# Patient Record
Sex: MALE | Employment: FULL TIME | URBAN - METROPOLITAN AREA
[De-identification: names, ages, dates, MRNs, and addresses within clinical notes are randomized per-mention and may not be internally consistent; named-entity substitution may affect disease eponyms.]

---

## 2018-05-01 ENCOUNTER — OFFICE VISIT (OUTPATIENT)
Dept: MEDICAL GROUP | Facility: MEDICAL CENTER | Age: 33
End: 2018-05-01
Payer: COMMERCIAL

## 2018-05-01 VITALS
TEMPERATURE: 98.5 F | BODY MASS INDEX: 28.29 KG/M2 | DIASTOLIC BLOOD PRESSURE: 68 MMHG | SYSTOLIC BLOOD PRESSURE: 116 MMHG | HEART RATE: 80 BPM | HEIGHT: 69 IN | OXYGEN SATURATION: 97 % | WEIGHT: 191 LBS

## 2018-05-01 DIAGNOSIS — F11.10 OPIOID ABUSE (HCC): ICD-10-CM

## 2018-05-01 DIAGNOSIS — D22.9 ATYPICAL NEVUS: ICD-10-CM

## 2018-05-01 DIAGNOSIS — F32.0 CURRENT MILD EPISODE OF MAJOR DEPRESSIVE DISORDER WITHOUT PRIOR EPISODE (HCC): ICD-10-CM

## 2018-05-01 DIAGNOSIS — Z76.89 ENCOUNTER TO ESTABLISH CARE: ICD-10-CM

## 2018-05-01 PROBLEM — F32.9 MAJOR DEPRESSIVE DISORDER WITH CURRENT ACTIVE EPISODE: Status: ACTIVE | Noted: 2018-05-01

## 2018-05-01 PROCEDURE — 99204 OFFICE O/P NEW MOD 45 MIN: CPT | Performed by: PHYSICIAN ASSISTANT

## 2018-05-01 ASSESSMENT — PATIENT HEALTH QUESTIONNAIRE - PHQ9
CLINICAL INTERPRETATION OF PHQ2 SCORE: 4
SUM OF ALL RESPONSES TO PHQ QUESTIONS 1-9: 12
5. POOR APPETITE OR OVEREATING: 0 - NOT AT ALL

## 2018-05-01 NOTE — ASSESSMENT & PLAN NOTE
Complains of a irregular lesion on his upper back. Been there for about 6 months. Would like to see dermatologist.

## 2018-05-01 NOTE — PROGRESS NOTES
"Subjective:   Jordon Mclean is a 33 y.o. male here today for opioid abuse, chronic history, and to establish care.    Opioid abuse  This is a 34-year-old male complains of a chronic history of opioid abuse. He's been abusing opioids for approximately 8 years. When he was younger he was on Ritalin for ADHD. He thinks that this all started the process of him abusing prescribed medications. Currently he will take approximately 60 mg of oxycodone daily. At one point he was taking 120 mg which he had no effects with. Currently it is disrupting his family. He is  with one child. They're trying to have more children. Also very costly habit. He buys the medication wherever he can find it. Denies that it affects his work. 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 She denies any family history of any drug abuse or psychoses. He does have some underlying depression which he believes is from withdrawing from the medication. Denies any anxiety. Would like to be referred to see a specialist. He has tried NA previously but doesn't like the structure of the program.     Atypical nevus  Complains of a irregular lesion on his upper back. Been there for about 6 months. Would like to see dermatologist.       Current medicines (including changes today)  No current outpatient prescriptions on file.     No current facility-administered medications for this visit.      He  has no past medical history on file.    Social History and Family History were reviewed and updated.    ROS   No chest pain, no shortness of breath, no abdominal pain and all other systems were reviewed and are negative.       Objective:     Blood pressure 116/68, pulse 80, temperature 36.9 °C (98.5 °F), height 1.753 m (5' 9\"), weight 86.6 kg (191 lb), SpO2 97 %. Body mass index is 28.21 kg/m².   Physical Exam:  Constitutional: Alert, no distress.  Skin: Warm, dry, good turgor, no rashes in visible areas. There is irregular bordered lesion with " dark central pigmentation on the upper left back.  Eye: Equal, round and reactive, conjunctiva clear, lids normal.  ENMT: Lips without lesions, good dentition, oropharynx clear.  Neck: Trachea midline, no masses.   Lymph: No cervical or supraclavicular lymphadenopathy  Respiratory: Unlabored respiratory effort, lungs appear clear, no wheezes.  Cardiovascular: Normal S1, S2, no murmur, no edema.  Psych: Alert and oriented x3, normal affect and mood.        Assessment and Plan:   The following treatment plan was discussed    1. Opioid abuse  Chronic condition. Referred to psychiatry and behavioral health. Discussed with possible medication options.  - Patient has been identified as being depressed and appropriate orders and counseling have been given  - REFERRAL TO PSYCHIATRY  - REFERRAL TO BEHAVIORAL HEALTH    2. Current mild episode of major depressive disorder without prior episode (HCC)  Chronic condition. Referred to psychiatry to establish care.  - Patient has been identified as being depressed and appropriate orders and counseling have been given  - REFERRAL TO PSYCHIATRY  - REFERRAL TO BEHAVIORAL HEALTH    3. Atypical nevus  Chronic condition. Refer to dermatology to establish care.  - REFERRAL TO DERMATOLOGY    4. Encounter to establish care      In follow-up we'll need to address labs to rule out early heart disease. Advised exercise routinely. Healthier.    Followup: Return if symptoms worsen or fail to improve.    Please note that this dictation was created using voice recognition software. I have made every reasonable attempt to correct obvious errors, but I expect that there are errors of grammar and possibly content that I did not discover before finalizing the note.

## 2018-05-01 NOTE — ASSESSMENT & PLAN NOTE
This is a 34-year-old male complains of a chronic history of opioid abuse. He's been abusing opioids for approximately 8 years. When he was younger he was on Ritalin for ADHD. He thinks that this all started the process of him abusing prescribed medications. Currently he will take approximately 60 mg of oxycodone daily. At one point he was taking 120 mg which he had no effects with. Currently it is disrupting his family. He is  with one child. They're trying to have more children. Also very costly habit. He buys the medication wherever he can find it. Denies that it affects his work. 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 She denies any family history of any drug abuse or psychoses. He does have some underlying depression which he believes is from withdrawing from the medication. Denies any anxiety. Would like to be referred to see a specialist. He has tried NA previously but doesn't like the structure of the program.

## 2018-08-17 ENCOUNTER — NON-PROVIDER VISIT (OUTPATIENT)
Dept: URGENT CARE | Facility: CLINIC | Age: 33
End: 2018-08-17

## 2018-08-17 DIAGNOSIS — Z02.1 PRE-EMPLOYMENT DRUG SCREENING: ICD-10-CM

## 2018-08-17 LAB
AMP AMPHETAMINE: NORMAL
COC COCAINE: NORMAL
INT CON NEG: NORMAL
INT CON POS: NORMAL
MET METHAMPHETAMINES: NORMAL
OPI OPIATES: NORMAL
PCP PHENCYCLIDINE: NORMAL
POC DRUG COMMENT 753798-OCCUPATIONAL HEALTH: NEGATIVE
THC: NORMAL

## 2018-08-17 PROCEDURE — 80305 DRUG TEST PRSMV DIR OPT OBS: CPT | Performed by: FAMILY MEDICINE

## 2018-09-06 ENCOUNTER — OFFICE VISIT (OUTPATIENT)
Dept: MEDICAL GROUP | Facility: MEDICAL CENTER | Age: 33
End: 2018-09-06
Payer: COMMERCIAL

## 2018-09-06 VITALS
SYSTOLIC BLOOD PRESSURE: 126 MMHG | BODY MASS INDEX: 28.93 KG/M2 | WEIGHT: 195.33 LBS | OXYGEN SATURATION: 97 % | HEART RATE: 88 BPM | TEMPERATURE: 97.5 F | HEIGHT: 69 IN | DIASTOLIC BLOOD PRESSURE: 80 MMHG

## 2018-09-06 DIAGNOSIS — M54.41 CHRONIC BILATERAL LOW BACK PAIN WITH RIGHT-SIDED SCIATICA: ICD-10-CM

## 2018-09-06 DIAGNOSIS — F51.01 PRIMARY INSOMNIA: ICD-10-CM

## 2018-09-06 DIAGNOSIS — F32.0 CURRENT MILD EPISODE OF MAJOR DEPRESSIVE DISORDER WITHOUT PRIOR EPISODE (HCC): ICD-10-CM

## 2018-09-06 DIAGNOSIS — F11.11 HISTORY OF OPIOID ABUSE (HCC): ICD-10-CM

## 2018-09-06 DIAGNOSIS — F39 MOOD DISORDER (HCC): ICD-10-CM

## 2018-09-06 DIAGNOSIS — G89.29 CHRONIC BILATERAL LOW BACK PAIN WITH RIGHT-SIDED SCIATICA: ICD-10-CM

## 2018-09-06 DIAGNOSIS — D22.9 ATYPICAL NEVUS: ICD-10-CM

## 2018-09-06 PROBLEM — M54.50 CHRONIC BILATERAL LOW BACK PAIN: Status: ACTIVE | Noted: 2018-09-06

## 2018-09-06 PROCEDURE — 99214 OFFICE O/P EST MOD 30 MIN: CPT | Performed by: PHYSICIAN ASSISTANT

## 2018-09-06 RX ORDER — TRAZODONE HYDROCHLORIDE 50 MG/1
50 TABLET ORAL
Qty: 90 TAB | Refills: 3 | Status: SHIPPED | OUTPATIENT
Start: 2018-09-06 | End: 2022-07-18

## 2018-09-06 RX ORDER — BACLOFEN 20 MG/1
20 TABLET ORAL 3 TIMES DAILY
COMMUNITY
End: 2018-09-06 | Stop reason: SDUPTHER

## 2018-09-06 RX ORDER — TRAZODONE HYDROCHLORIDE 50 MG/1
50 TABLET ORAL NIGHTLY
COMMUNITY
End: 2018-09-06 | Stop reason: SDUPTHER

## 2018-09-06 RX ORDER — BACLOFEN 20 MG/1
20 TABLET ORAL 3 TIMES DAILY
Qty: 30 TAB | Refills: 0 | Status: SHIPPED | OUTPATIENT
Start: 2018-09-06 | End: 2019-04-23 | Stop reason: SDUPTHER

## 2018-09-06 RX ORDER — NALTREXONE HYDROCHLORIDE 50 MG/1
50 TABLET, FILM COATED ORAL DAILY
COMMUNITY
End: 2018-09-06 | Stop reason: SDUPTHER

## 2018-09-06 RX ORDER — OXCARBAZEPINE 300 MG/1
300 TABLET, FILM COATED ORAL 2 TIMES DAILY
Qty: 60 TAB | Refills: 0 | Status: SHIPPED | OUTPATIENT
Start: 2018-09-06 | End: 2018-10-15 | Stop reason: SDUPTHER

## 2018-09-06 RX ORDER — ESCITALOPRAM OXALATE 20 MG/1
20 TABLET ORAL DAILY
Qty: 90 TAB | Refills: 3 | Status: SHIPPED | OUTPATIENT
Start: 2018-09-06 | End: 2019-09-03 | Stop reason: SDUPTHER

## 2018-09-06 RX ORDER — ESCITALOPRAM OXALATE 20 MG/1
20 TABLET ORAL DAILY
COMMUNITY
End: 2018-09-06 | Stop reason: SDUPTHER

## 2018-09-06 RX ORDER — OXCARBAZEPINE 300 MG/1
300 TABLET, FILM COATED ORAL 2 TIMES DAILY
COMMUNITY
End: 2018-09-06 | Stop reason: SDUPTHER

## 2018-09-06 RX ORDER — NALTREXONE HYDROCHLORIDE 50 MG/1
50 TABLET, FILM COATED ORAL DAILY
Qty: 30 TAB | Refills: 0 | Status: SHIPPED | OUTPATIENT
Start: 2018-09-06 | End: 2019-04-23

## 2018-09-06 NOTE — ASSESSMENT & PLAN NOTE
His also currently taking Trileptal 300 mg twice a day. States medication is used as a mood stabilizer. Denies any homicidal or suicidal ideations. Doesn't yet have an appointment to see psychiatry.

## 2018-09-06 NOTE — ASSESSMENT & PLAN NOTE
Has insomnia. Takes trazodone 50 mg at bedtime. Medication does cause drowsiness. Takes only when needed. Was on a higher dose through treatment.

## 2018-09-06 NOTE — ASSESSMENT & PLAN NOTE
This is a 33-year-old male who came in to the office late today. He has chronic use of opioids and other polysubstances. He was down at [facility] for a few weeks detoxing. The initial trigger for the detox was a DUI. He is currently on Depade 50 mg daily. Has not used opioids in several months.

## 2018-09-06 NOTE — PROGRESS NOTES
Subjective:   Jordon Mclean is a 33 y.o. male here today for history of opioid abuse, mood disorder, depression, chronic low back pain and primary insomnia.    History of opioid abuse  This is a 33-year-old male who came in to the office late today. He has chronic use of opioids and other polysubstances. He was down at [facility] for a few weeks detoxing. The initial trigger for the detox was a DUI. He is currently on Depade 50 mg daily. Has not used opioids in several months.    Mood disorder (HCC)  His also currently taking Trileptal 300 mg twice a day. States medication is used as a mood stabilizer. Denies any homicidal or suicidal ideations. Doesn't yet have an appointment to see psychiatry.    Major depressive disorder with current active episode  In the past I diagnosed him with depression. Although he doesn't feel depressed he is taking Lexapro. 20 mg daily.    Chronic bilateral low back pain with right-sided sciatica  During his treatment he was dealing with chronic low back pain. Right-sided sciatica. He was given baclofen 20 mg which she will take as needed for back pain as well as sleep. Like to follow up with a specialist not to discuss medication but to discuss treatment options.    Atypical nevus  Never followed up with dermatology for the mole on his back. Would like to have another referral.    Primary insomnia  Has insomnia. Takes trazodone 50 mg at bedtime. Medication does cause drowsiness. Takes only when needed. Was on a higher dose through treatment.       Current medicines (including changes today)  Current Outpatient Prescriptions   Medication Sig Dispense Refill   • escitalopram (LEXAPRO) 20 MG tablet Take 1 Tab by mouth every day. 90 Tab 3   • OXcarbazepine (TRILEPTAL) 300 MG Tab Take 1 Tab by mouth 2 times a day. 60 Tab 0   • traZODone (DESYREL) 50 MG Tab Take 1 Tab by mouth every bedtime. 90 Tab 3   • naltrexone (DEPADE) 50 MG Tab Take 1 Tab by mouth every day. 30 Tab 0   •  "baclofen (LIORESAL) 20 MG tablet Take 1 Tab by mouth 3 times a day. 30 Tab 0     No current facility-administered medications for this visit.      He  has no past medical history on file.    Social History and Family History were reviewed and updated.    ROS   No chest pain, no shortness of breath, no abdominal pain and all other systems were reviewed and are negative.       Objective:     Blood pressure 126/80, pulse 88, temperature 36.4 °C (97.5 °F), height 1.753 m (5' 9\"), weight 88.6 kg (195 lb 5.2 oz), SpO2 97 %. Body mass index is 28.84 kg/m².   Physical Exam:  Constitutional: Alert, no distress.  Skin: Warm, dry, good turgor, no rashes in visible areas.  Eye: Equal, round and reactive, conjunctiva clear, lids normal.  ENMT: Lips without lesions, good dentition, oropharynx clear.  Neck: Trachea midline, no masses.   Lymph: No cervical or supraclavicular lymphadenopathy  Respiratory: Unlabored respiratory effort, lungs appear clear, no wheezes.  Cardiovascular: Normal S1, S2, no murmur, no edema.  Psych: Alert and oriented x3, normal affect and mood.        Assessment and Plan:   The following treatment plan was discussed    1. History of opioid abuse  In remission. Renewed naltrexone one months supply. Refer to psychiatry.  - naltrexone (DEPADE) 50 MG Tab; Take 1 Tab by mouth every day.  Dispense: 30 Tab; Refill: 0  - REFERRAL TO PSYCHIATRY    2. Current mild episode of major depressive disorder without prior episode (HCC)  Chronic condition. Stable. Prescribed Lexapro 20 mg. 4 to psychiatry.  - escitalopram (LEXAPRO) 20 MG tablet; Take 1 Tab by mouth every day.  Dispense: 90 Tab; Refill: 3  - REFERRAL TO PSYCHIATRY    3. Mood disorder (HCC)  Chronic condition. Stable. Prescribed Trileptal 300 mg twice a day. Referred to psychiatry.  - OXcarbazepine (TRILEPTAL) 300 MG Tab; Take 1 Tab by mouth 2 times a day.  Dispense: 60 Tab; Refill: 0  - REFERRAL TO PSYCHIATRY    4. Primary insomnia  Chronic condition. " Stable. Renewed trazodone. Take as needed.  - traZODone (DESYREL) 50 MG Tab; Take 1 Tab by mouth every bedtime.  Dispense: 90 Tab; Refill: 3  - REFERRAL TO PSYCHIATRY    5. Chronic bilateral low back pain with right-sided sciatica  Chronic condition. Referred to physiatry to establish care. Provided prescription for baclofen as needed.  - baclofen (LIORESAL) 20 MG tablet; Take 1 Tab by mouth 3 times a day.  Dispense: 30 Tab; Refill: 0  - REFERRAL TO PHYSIATRY (PMR)    6. Atypical nevus  Referred again to dermatology.  - REFERRAL TO DERMATOLOGY      Followup: Return in about 4 weeks (around 10/4/2018), or if symptoms worsen or fail to improve.    Please note that this dictation was created using voice recognition software. I have made every reasonable attempt to correct obvious errors, but I expect that there are errors of grammar and possibly content that I did not discover before finalizing the note.

## 2018-09-06 NOTE — ASSESSMENT & PLAN NOTE
During his treatment he was dealing with chronic low back pain. Right-sided sciatica. He was given baclofen 20 mg which she will take as needed for back pain as well as sleep. Like to follow up with a specialist not to discuss medication but to discuss treatment options.

## 2018-10-15 DIAGNOSIS — F39 MOOD DISORDER (HCC): ICD-10-CM

## 2018-10-16 RX ORDER — OXCARBAZEPINE 300 MG/1
TABLET, FILM COATED ORAL
Qty: 60 TAB | Refills: 3 | Status: SHIPPED | OUTPATIENT
Start: 2018-10-16 | End: 2019-12-20

## 2018-12-18 ENCOUNTER — APPOINTMENT (RX ONLY)
Dept: URBAN - METROPOLITAN AREA CLINIC 22 | Facility: CLINIC | Age: 33
Setting detail: DERMATOLOGY
End: 2018-12-18

## 2018-12-18 DIAGNOSIS — Z71.89 OTHER SPECIFIED COUNSELING: ICD-10-CM

## 2018-12-18 DIAGNOSIS — L81.4 OTHER MELANIN HYPERPIGMENTATION: ICD-10-CM

## 2018-12-18 DIAGNOSIS — L82.1 OTHER SEBORRHEIC KERATOSIS: ICD-10-CM

## 2018-12-18 DIAGNOSIS — D22 MELANOCYTIC NEVI: ICD-10-CM

## 2018-12-18 PROBLEM — D48.5 NEOPLASM OF UNCERTAIN BEHAVIOR OF SKIN: Status: ACTIVE | Noted: 2018-12-18

## 2018-12-18 PROBLEM — D22.61 MELANOCYTIC NEVI OF RIGHT UPPER LIMB, INCLUDING SHOULDER: Status: ACTIVE | Noted: 2018-12-18

## 2018-12-18 PROBLEM — D22.5 MELANOCYTIC NEVI OF TRUNK: Status: ACTIVE | Noted: 2018-12-18

## 2018-12-18 PROBLEM — D22.62 MELANOCYTIC NEVI OF LEFT UPPER LIMB, INCLUDING SHOULDER: Status: ACTIVE | Noted: 2018-12-18

## 2018-12-18 PROCEDURE — ? BIOPSY BY SHAVE METHOD

## 2018-12-18 PROCEDURE — ? COUNSELING

## 2018-12-18 PROCEDURE — 11100: CPT

## 2018-12-18 PROCEDURE — 99202 OFFICE O/P NEW SF 15 MIN: CPT | Mod: 25

## 2018-12-18 PROCEDURE — ? REFERRAL CORRESPONDENCE

## 2018-12-18 ASSESSMENT — LOCATION SIMPLE DESCRIPTION DERM
LOCATION SIMPLE: LEFT FOREARM
LOCATION SIMPLE: RIGHT FOREARM
LOCATION SIMPLE: ABDOMEN
LOCATION SIMPLE: RIGHT LOWER BACK
LOCATION SIMPLE: RIGHT UPPER BACK

## 2018-12-18 ASSESSMENT — LOCATION DETAILED DESCRIPTION DERM
LOCATION DETAILED: LEFT PROXIMAL DORSAL FOREARM
LOCATION DETAILED: RIGHT SUPERIOR UPPER BACK
LOCATION DETAILED: LEFT DISTAL DORSAL FOREARM
LOCATION DETAILED: RIGHT MEDIAL UPPER BACK
LOCATION DETAILED: EPIGASTRIC SKIN
LOCATION DETAILED: RIGHT SUPERIOR LATERAL UPPER BACK
LOCATION DETAILED: RIGHT PROXIMAL DORSAL FOREARM
LOCATION DETAILED: RIGHT SUPERIOR MEDIAL MIDBACK

## 2018-12-18 ASSESSMENT — LOCATION ZONE DERM
LOCATION ZONE: TRUNK
LOCATION ZONE: ARM

## 2018-12-18 NOTE — PROCEDURE: BIOPSY BY SHAVE METHOD
Detail Level: Detailed
Biopsy Method: Personna blade
Curettage Text: The wound bed was treated with curettage after the biopsy was performed.
Lab: [id number]
Silver Nitrate Text: The wound bed was treated with silver nitrate after the biopsy was performed.
Notification Instructions: Patient will be notified of biopsy results. However, patient instructed to call the office if not contacted within 2 weeks.
Bill For Surgical Tray: no
Additional Anesthesia Volume In Cc (Will Not Render If 0): 0
Cryotherapy Text: The wound bed was treated with cryotherapy after the biopsy was performed.
Anesthesia Type: 1% lidocaine with 1:100,000 epinephrine
Lab Facility: 
Render Post-Care Instructions In Note?: yes
Wound Care: Vaseline
Consent: Written consent was obtained and risks were reviewed including but not limited to scarring, infection, bleeding, scabbing, incomplete removal, nerve damage and allergy to anesthesia.
Billing Type: Third-Party Bill
Depth Of Biopsy: dermis
Anesthesia Volume In Cc: 1
Biopsy Type: H and E
Electrodesiccation Text: The wound bed was treated with electrodesiccation after the biopsy was performed.
Post-Care Instructions: I reviewed with the patient in detail post-care instructions. Patient is to keep the biopsy site dry overnight, and then apply bacitracin twice daily until healed. Patient may apply hydrogen peroxide soaks to remove any crusting.
Hemostasis: Drysol
Type Of Destruction Used: Curettage
Electrodesiccation And Curettage Text: The wound bed was treated with electrodesiccation and curettage after the biopsy was performed.
Dressing: bandage

## 2019-04-23 ENCOUNTER — OFFICE VISIT (OUTPATIENT)
Dept: MEDICAL GROUP | Facility: MEDICAL CENTER | Age: 34
End: 2019-04-23
Payer: COMMERCIAL

## 2019-04-23 VITALS
BODY MASS INDEX: 32.98 KG/M2 | RESPIRATION RATE: 18 BRPM | HEART RATE: 78 BPM | TEMPERATURE: 98.6 F | OXYGEN SATURATION: 94 % | HEIGHT: 69 IN | SYSTOLIC BLOOD PRESSURE: 126 MMHG | WEIGHT: 222.66 LBS | DIASTOLIC BLOOD PRESSURE: 74 MMHG

## 2019-04-23 DIAGNOSIS — G89.29 CHRONIC BILATERAL LOW BACK PAIN WITH RIGHT-SIDED SCIATICA: ICD-10-CM

## 2019-04-23 DIAGNOSIS — M54.41 CHRONIC BILATERAL LOW BACK PAIN WITH RIGHT-SIDED SCIATICA: ICD-10-CM

## 2019-04-23 DIAGNOSIS — E66.9 OBESITY (BMI 30-39.9): ICD-10-CM

## 2019-04-23 PROCEDURE — 99214 OFFICE O/P EST MOD 30 MIN: CPT | Performed by: PHYSICIAN ASSISTANT

## 2019-04-23 RX ORDER — BACLOFEN 20 MG/1
20 TABLET ORAL 3 TIMES DAILY
Qty: 30 TAB | Refills: 0 | Status: SHIPPED | OUTPATIENT
Start: 2019-04-23 | End: 2019-04-23 | Stop reason: SDUPTHER

## 2019-04-23 RX ORDER — BACLOFEN 20 MG/1
20 TABLET ORAL 3 TIMES DAILY
Qty: 60 TAB | Refills: 1 | Status: SHIPPED | OUTPATIENT
Start: 2019-04-23 | End: 2019-12-20

## 2019-04-23 ASSESSMENT — PATIENT HEALTH QUESTIONNAIRE - PHQ9
5. POOR APPETITE OR OVEREATING: MORE THAN HALF THE DAYS
SUM OF ALL RESPONSES TO PHQ QUESTIONS 1-9: 14
8. MOVING OR SPEAKING SO SLOWLY THAT OTHER PEOPLE COULD HAVE NOTICED. OR THE OPPOSITE, BEING SO FIGETY OR RESTLESS THAT YOU HAVE BEEN MOVING AROUND A LOT MORE THAN USUAL: NOT AT ALL
6. FEELING BAD ABOUT YOURSELF - OR THAT YOU ARE A FAILURE OR HAVE LET YOURSELF OR YOUR FAMILY DOWN: SEVERAL DAYS
9. THOUGHTS THAT YOU WOULD BE BETTER OFF DEAD, OR OF HURTING YOURSELF: NOT AT ALL
SUM OF ALL RESPONSES TO PHQ9 QUESTIONS 1 AND 2: 2
2. FEELING DOWN, DEPRESSED, IRRITABLE, OR HOPELESS: SEVERAL DAYS
3. TROUBLE FALLING OR STAYING ASLEEP OR SLEEPING TOO MUCH: NEARLY EVERY DAY
4. FEELING TIRED OR HAVING LITTLE ENERGY: NEARLY EVERY DAY
1. LITTLE INTEREST OR PLEASURE IN DOING THINGS: SEVERAL DAYS
7. TROUBLE CONCENTRATING ON THINGS, SUCH AS READING THE NEWSPAPER OR WATCHING TELEVISION: NEARLY EVERY DAY

## 2019-04-23 NOTE — PROGRESS NOTES
Subjective:   Jordon Mclean is a 34 y.o. male here today for chronic low back pain with right-sided sciatica with continuing of symptoms.    Chronic bilateral low back pain with right-sided sciatica  This is a 34-year-old male who returns today to discuss his chronic history of low back pain and right-sided sciatica.  I saw him in September and referred him to physiatry.  He thought conservative therapy with chiropractic services as well as physical therapy would be more helpful so he never followed up with physiatry.  States he has pain now consistently.  Has pain in the foot that radiates up to the leg and vice versa.  States he has pain the minute he wakes up.  Pain lasts all day long and is worse when he stands all day.  Also has pain at nighttime.  He believes he may have plantar fasciitis.  He has a history of opiate abuse.  Last time we talked about referral but no medication.  He still does not want any medication.  He was taking Flexeril in the past which I provided him at bedtime.  Would like a renewal of that.  States that he is in a better place with his life.  His wife is pregnant with their second child.  States that his depression is stable.  Still taking his medication for depression as directed.  No refill needed.       Current medicines (including changes today)  Current Outpatient Prescriptions   Medication Sig Dispense Refill   • baclofen (LIORESAL) 20 MG tablet Take 1 Tab by mouth 3 times a day. 60 Tab 1   • OXcarbazepine (TRILEPTAL) 300 MG Tab TAKE 1 TABLET BY MOUTH TWICE DAILY 60 Tab 3   • escitalopram (LEXAPRO) 20 MG tablet Take 1 Tab by mouth every day. 90 Tab 3   • traZODone (DESYREL) 50 MG Tab Take 1 Tab by mouth every bedtime. 90 Tab 3     No current facility-administered medications for this visit.      He  has no past medical history on file.    Social History and Family History were reviewed and updated.    ROS   No chest pain, no shortness of breath, no abdominal pain and all  "other systems were reviewed and are negative.       Objective:     /74 (BP Location: Left arm, Patient Position: Sitting, BP Cuff Size: Adult)   Pulse 78   Temp 37 °C (98.6 °F) (Temporal)   Resp 18   Ht 1.753 m (5' 9\")   Wt 101 kg (222 lb 10.6 oz)   SpO2 94%  Body mass index is 32.88 kg/m².   Physical Exam:  Constitutional: Alert, no distress.  Skin: Warm, dry, good turgor, no rashes in visible areas.  Eye: Equal, round and reactive, conjunctiva clear, lids normal.  ENMT: Lips without lesions, good dentition, oropharynx clear.  Neck: Trachea midline, no masses.   Lymph: No cervical or supraclavicular lymphadenopathy  Respiratory: Unlabored respiratory effort, lungs appear clear, no wheezes.  Cardiovascular: Normal S1, S2, no murmur, no edema.  Muscular skeletal: Right-sided lower paraspinal tenderness.  DTRs 2+.  Psych: Alert and oriented x3, normal affect and mood.        Assessment and Plan:   The following treatment plan was discussed    1. Chronic bilateral low back pain with right-sided sciatica  Chronic condition.  Discussed likely not with plantar fasciitis.  Foot pain would not radiate up to the leg and into the back.  Ordered MRI to rule out some impingement or stenosis.  Referred to [facility] [place] for evaluation also renewed baclofen as directed.  - REFERRAL TO PAIN MANAGEMENT  - MR-LUMBAR SPINE-W/O; Future  - baclofen (LIORESAL) 20 MG tablet; Take 1 Tab by mouth 3 times a day.  Dispense: 60 Tab; Refill: 1    2. Obesity (BMI 30-39.9)  New condition noted.  We will continue to monitor.  He is aware that now his body mass is considered obese.  - Patient identified as having weight management issue.  Appropriate orders and counseling given.    Patient was seen for 25 minutes face to face of which > 50% of appointment time was spent on counseling and coordination of care regarding the above.      Followup: Return in about 4 weeks (around 5/21/2019), or if symptoms worsen or fail to improve.    Please " note that this dictation was created using voice recognition software. I have made every reasonable attempt to correct obvious errors, but I expect that there are errors of grammar and possibly content that I did not discover before finalizing the note.

## 2019-04-23 NOTE — ASSESSMENT & PLAN NOTE
This is a 34-year-old male who returns today to discuss his chronic history of low back pain and right-sided sciatica.  I saw him in September and referred him to physiatry.  He thought conservative therapy with chiropractic services as well as physical therapy would be more helpful so he never followed up with physiatry.  States he has pain now consistently.  Has pain in the foot that radiates up to the leg and vice versa.  States he has pain the minute he wakes up.  Pain lasts all day long and is worse when he stands all day.  Also has pain at nighttime.  He believes he may have plantar fasciitis.  He has a history of opiate abuse.  Last time we talked about referral but no medication.  He still does not want any medication.  He was taking Flexeril in the past which I provided him at bedtime.  Would like a renewal of that.  States that he is in a better place with his life.  His wife is pregnant with their second child.  States that his depression is stable.  Still taking his medication for depression as directed.  No refill needed.

## 2019-10-20 DIAGNOSIS — G89.29 CHRONIC BILATERAL LOW BACK PAIN WITH RIGHT-SIDED SCIATICA: ICD-10-CM

## 2019-10-20 DIAGNOSIS — M54.41 CHRONIC BILATERAL LOW BACK PAIN WITH RIGHT-SIDED SCIATICA: ICD-10-CM

## 2019-12-20 ENCOUNTER — OFFICE VISIT (OUTPATIENT)
Dept: MEDICAL GROUP | Facility: MEDICAL CENTER | Age: 34
End: 2019-12-20
Payer: COMMERCIAL

## 2019-12-20 VITALS
TEMPERATURE: 98.2 F | SYSTOLIC BLOOD PRESSURE: 124 MMHG | RESPIRATION RATE: 16 BRPM | HEIGHT: 69 IN | WEIGHT: 220 LBS | HEART RATE: 90 BPM | DIASTOLIC BLOOD PRESSURE: 62 MMHG | OXYGEN SATURATION: 94 % | BODY MASS INDEX: 32.58 KG/M2

## 2019-12-20 DIAGNOSIS — F41.1 GAD (GENERALIZED ANXIETY DISORDER): ICD-10-CM

## 2019-12-20 DIAGNOSIS — F32.0 CURRENT MILD EPISODE OF MAJOR DEPRESSIVE DISORDER WITHOUT PRIOR EPISODE (HCC): ICD-10-CM

## 2019-12-20 DIAGNOSIS — Z23 NEED FOR VACCINATION: ICD-10-CM

## 2019-12-20 PROCEDURE — 90471 IMMUNIZATION ADMIN: CPT | Performed by: PHYSICIAN ASSISTANT

## 2019-12-20 PROCEDURE — 90686 IIV4 VACC NO PRSV 0.5 ML IM: CPT | Performed by: PHYSICIAN ASSISTANT

## 2019-12-20 PROCEDURE — 99214 OFFICE O/P EST MOD 30 MIN: CPT | Mod: 25 | Performed by: PHYSICIAN ASSISTANT

## 2019-12-20 RX ORDER — ESCITALOPRAM OXALATE 10 MG/1
10 TABLET ORAL DAILY
Qty: 30 TAB | Refills: 0 | Status: SHIPPED | OUTPATIENT
Start: 2019-12-20 | End: 2020-01-17

## 2019-12-20 RX ORDER — VENLAFAXINE HYDROCHLORIDE 37.5 MG/1
37.5 CAPSULE, EXTENDED RELEASE ORAL DAILY
Qty: 30 CAP | Refills: 3 | Status: SHIPPED | OUTPATIENT
Start: 2019-12-20 | End: 2020-01-17

## 2019-12-20 NOTE — ASSESSMENT & PLAN NOTE
This is a 34-year-old male who is here today to discuss a chronic history of depression and anxiety.  Depression has been stable but anxiety has been an underlying concern since starting Lexapro.  He is currently on 20 mg.  States that his reaction to issues of concerns do not aggravate him as much as the used to.  He still though has some issues with anxiety and getting overwhelmed.  Is wondering if his medication should be changed.  Spoke to a clinical psychologist that advised to maybe update the medication.  He is not on Trileptal any longer.  Weaned off the medication.  Also not taking baclofen for his chronic low back pain.  Overall though is doing well with a .  Family life is going well.  He is very active and trying to eat healthier.  Denies any homicidal or suicidal ideations.

## 2019-12-20 NOTE — PROGRESS NOTES
"Subjective:   Jordon Mclean is a 34 y.o. male here today for depression and anxiety.  Also need for influenza vaccination.    Major depressive disorder with current active episode  This is a 34-year-old male who is here today to discuss a chronic history of depression and anxiety.  Depression has been stable but anxiety has been an underlying concern since starting Lexapro.  He is currently on 20 mg.  States that his reaction to issues of concerns do not aggravate him as much as the used to.  He still though has some issues with anxiety and getting overwhelmed.  Is wondering if his medication should be changed.  Spoke to a clinical psychologist that advised to maybe update the medication.  He is not on Trileptal any longer.  Weaned off the medication.  Also not taking baclofen for his chronic low back pain.  Overall though is doing well with a .  Family life is going well.  He is very active and trying to eat healthier.  Denies any homicidal or suicidal ideations.       Current medicines (including changes today)  Current Outpatient Medications   Medication Sig Dispense Refill   • venlafaxine XR (EFFEXOR XR) 37.5 MG CAPSULE SR 24 HR Take 1 Cap by mouth every day. 30 Cap 3   • escitalopram (LEXAPRO) 10 MG Tab Take 1 Tab by mouth every day. 30 Tab 0   • escitalopram (LEXAPRO) 20 MG tablet Take 1 Tab by mouth every day. 30 Tab 0   • traZODone (DESYREL) 50 MG Tab Take 1 Tab by mouth every bedtime. 90 Tab 3     No current facility-administered medications for this visit.      He  has no past medical history on file.    Social History and Family History were reviewed and updated.    ROS   No chest pain, no shortness of breath, no abdominal pain and all other systems were reviewed and are negative.       Objective:     /62 (BP Location: Right arm, Patient Position: Sitting, BP Cuff Size: Adult)   Pulse 90   Temp 36.8 °C (98.2 °F) (Temporal)   Resp 16   Ht 1.753 m (5' 9\")   Wt 99.8 kg (220 lb)   " SpO2 94%  Body mass index is 32.49 kg/m².   Physical Exam:  Constitutional: Alert, no distress.  Skin: Warm, dry, good turgor, no rashes in visible areas.  Eye: Equal, round and reactive, conjunctiva clear, lids normal.  ENMT: Lips without lesions, good dentition, oropharynx clear.  Neck: Trachea midline, no masses.   Lymph: No cervical or supraclavicular lymphadenopathy  Respiratory: Unlabored respiratory effort, lungs appear clear, no wheezes.  Cardiovascular: Regular rate and rhythm.  Psych: Alert and oriented x3, normal affect and mood.      Assessment and Plan:   The following treatment plan was discussed    1. Current mild episode of major depressive disorder without prior episode (HCC)  Chronic condition.  Stable.  Given the anxiety will wean off the Lexapro and will start Effexor.  Discussed cross tapering.  - venlafaxine XR (EFFEXOR XR) 37.5 MG CAPSULE SR 24 HR; Take 1 Cap by mouth every day.  Dispense: 30 Cap; Refill: 3  - escitalopram (LEXAPRO) 10 MG Tab; Take 1 Tab by mouth every day.  Dispense: 30 Tab; Refill: 0    2. MERCEDEZ (generalized anxiety disorder)  Chronic condition.  Given the concern with anxiety today we will cross taper to Effexor.  Discussed likely same side effect profile.  Stop Lexapro after 5 to 10 days.  We will go to 10 mg from 20.  - venlafaxine XR (EFFEXOR XR) 37.5 MG CAPSULE SR 24 HR; Take 1 Cap by mouth every day.  Dispense: 30 Cap; Refill: 3  - escitalopram (LEXAPRO) 10 MG Tab; Take 1 Tab by mouth every day.  Dispense: 30 Tab; Refill: 0    3. Need for vaccination  Administered without complaints.  - Influenza Vaccine Quad Injection (PF)      Followup: Return in about 4 weeks (around 1/17/2020), or if symptoms worsen or fail to improve.    Please note that this dictation was created using voice recognition software. I have made every reasonable attempt to correct obvious errors, but I expect that there are errors of grammar and possibly content that I did not discover before  finalizing the note.

## 2020-01-17 ENCOUNTER — OFFICE VISIT (OUTPATIENT)
Dept: MEDICAL GROUP | Facility: MEDICAL CENTER | Age: 35
End: 2020-01-17
Payer: COMMERCIAL

## 2020-01-17 VITALS
SYSTOLIC BLOOD PRESSURE: 120 MMHG | DIASTOLIC BLOOD PRESSURE: 66 MMHG | WEIGHT: 218 LBS | HEIGHT: 69 IN | RESPIRATION RATE: 16 BRPM | TEMPERATURE: 98.2 F | OXYGEN SATURATION: 94 % | HEART RATE: 82 BPM | BODY MASS INDEX: 32.29 KG/M2

## 2020-01-17 DIAGNOSIS — F51.01 PRIMARY INSOMNIA: ICD-10-CM

## 2020-01-17 DIAGNOSIS — F32.0 CURRENT MILD EPISODE OF MAJOR DEPRESSIVE DISORDER WITHOUT PRIOR EPISODE (HCC): ICD-10-CM

## 2020-01-17 DIAGNOSIS — F41.1 GAD (GENERALIZED ANXIETY DISORDER): ICD-10-CM

## 2020-01-17 PROCEDURE — 99214 OFFICE O/P EST MOD 30 MIN: CPT | Performed by: PHYSICIAN ASSISTANT

## 2020-01-17 RX ORDER — VENLAFAXINE HYDROCHLORIDE 75 MG/1
75 CAPSULE, EXTENDED RELEASE ORAL DAILY
Qty: 30 CAP | Refills: 3 | Status: SHIPPED | OUTPATIENT
Start: 2020-01-17 | End: 2020-04-17 | Stop reason: SDUPTHER

## 2020-01-17 NOTE — PROGRESS NOTES
"Subjective:   Jordon Mclean is a 34 y.o. male here today for depression, anxiety and insomnia.    Major depressive disorder with current active episode  This is a 34-year-old male here today to discuss a chronic history of anxiety and depression.  During last visit anxiety was worse so I crossed weaned him off of Lexapro onto Effexor.  He did eventually wean off of Lexapro.  Has been taking 37.5 mg of Effexor over the past few weeks.  Has had some improvement but he does have outlying stresses such as job and and with his personal life and a baby.  Would like to increase on venlafaxine.    Primary insomnia  Chronic condition.  Insomnia has improved a lot.  Initially I gave him trazodone.  That caused him to feel groggy but was effective but currently he is doing well without any medication.       Current medicines (including changes today)  Current Outpatient Medications   Medication Sig Dispense Refill   • venlafaxine XR (EFFEXOR XR) 75 MG CAPSULE SR 24 HR Take 1 Cap by mouth every day. 30 Cap 3   • traZODone (DESYREL) 50 MG Tab Take 1 Tab by mouth every bedtime. 90 Tab 3     No current facility-administered medications for this visit.      He  has no past medical history on file.    Social History and Family History were reviewed and updated.    ROS   No chest pain, no shortness of breath, no abdominal pain and all other systems were reviewed and are negative.       Objective:     /66 (BP Location: Right arm, Patient Position: Sitting, BP Cuff Size: Adult)   Pulse 82   Temp 36.8 °C (98.2 °F) (Temporal)   Resp 16   Ht 1.753 m (5' 9\")   Wt 98.9 kg (218 lb)   SpO2 94%  Body mass index is 32.19 kg/m².   Physical Exam:  Constitutional: Alert, no distress.  Skin: Warm, dry, good turgor, no rashes in visible areas.  Eye: Equal, round and reactive, conjunctiva clear, lids normal.  ENMT: Lips without lesions, good dentition, oropharynx clear.  Neck: Trachea midline, no masses.   Lymph: No cervical or " supraclavicular lymphadenopathy  Respiratory: Unlabored respiratory effort, lungs appear clear, no wheezes.  Cardiovascular: Regular rate and rhythm.  Psych: Alert and oriented x3, normal affect and mood.        Assessment and Plan:   The following treatment plan was discussed    1. Current mild episode of major depressive disorder without prior episode (HCC)  Chronic condition.  Stable.  Increase Effexor to 75 mg.  - venlafaxine XR (EFFEXOR XR) 75 MG CAPSULE SR 24 HR; Take 1 Cap by mouth every day.  Dispense: 30 Cap; Refill: 3    2. MERCEDEZ (generalized anxiety disorder)  Chronic condition.  Stable.  Increase Effexor to 75 mg.  - venlafaxine XR (EFFEXOR XR) 75 MG CAPSULE SR 24 HR; Take 1 Cap by mouth every day.  Dispense: 30 Cap; Refill: 3    3. Primary insomnia  Chronic condition.  Stable.  Continue trazodone as needed.      Followup: Return in about 3 months (around 4/17/2020), or if symptoms worsen or fail to improve.    Please note that this dictation was created using voice recognition software. I have made every reasonable attempt to correct obvious errors, but I expect that there are errors of grammar and possibly content that I did not discover before finalizing the note.

## 2020-01-17 NOTE — ASSESSMENT & PLAN NOTE
Chronic condition.  Insomnia has improved a lot.  Initially I gave him trazodone.  That caused him to feel groggy but was effective but currently he is doing well without any medication.

## 2020-01-17 NOTE — ASSESSMENT & PLAN NOTE
This is a 34-year-old male here today to discuss a chronic history of anxiety and depression.  During last visit anxiety was worse so I crossed weaned him off of Lexapro onto Effexor.  He did eventually wean off of Lexapro.  Has been taking 37.5 mg of Effexor over the past few weeks.  Has had some improvement but he does have outlying stresses such as job and and with his personal life and a baby.  Would like to increase on venlafaxine.

## 2020-04-17 ENCOUNTER — OFFICE VISIT (OUTPATIENT)
Dept: MEDICAL GROUP | Facility: MEDICAL CENTER | Age: 35
End: 2020-04-17
Payer: COMMERCIAL

## 2020-04-17 VITALS
HEART RATE: 80 BPM | BODY MASS INDEX: 33.33 KG/M2 | TEMPERATURE: 98.5 F | WEIGHT: 225 LBS | SYSTOLIC BLOOD PRESSURE: 122 MMHG | DIASTOLIC BLOOD PRESSURE: 72 MMHG | OXYGEN SATURATION: 94 % | RESPIRATION RATE: 16 BRPM | HEIGHT: 69 IN

## 2020-04-17 DIAGNOSIS — F32.0 CURRENT MILD EPISODE OF MAJOR DEPRESSIVE DISORDER WITHOUT PRIOR EPISODE (HCC): ICD-10-CM

## 2020-04-17 DIAGNOSIS — K60.2 ANAL FISSURE: ICD-10-CM

## 2020-04-17 DIAGNOSIS — F41.1 GENERALIZED ANXIETY DISORDER: ICD-10-CM

## 2020-04-17 PROCEDURE — 99214 OFFICE O/P EST MOD 30 MIN: CPT | Performed by: PHYSICIAN ASSISTANT

## 2020-04-17 RX ORDER — VENLAFAXINE HYDROCHLORIDE 150 MG/1
150 CAPSULE, EXTENDED RELEASE ORAL DAILY
Qty: 90 CAP | Refills: 1 | Status: SHIPPED | OUTPATIENT
Start: 2020-04-17 | End: 2020-07-20 | Stop reason: SDUPTHER

## 2020-04-17 NOTE — ASSESSMENT & PLAN NOTE
This is a 35-year-old male here today to discuss anxiety.  Chronic history of of both anxiety and depression.  He is doing better on Effexor.  Currently on 75 mg daily.  Has no side effects taking the medication.  Depression is stable.  Still has some anxiety.  Believes anxiety may be more affected now with the pandemic.  He is interested potentially an increase in the dose.  He is currently working.  His wife was furloughed.  She is home with the 2 children for schooling.

## 2020-04-17 NOTE — ASSESSMENT & PLAN NOTE
Last month he had a week history of rectal bleeding with searing pain.  Thought maybe had a hemorrhoid.  Symptoms have resolved.  States that there was blood on the stool with bowel movements.

## 2020-07-20 ENCOUNTER — OFFICE VISIT (OUTPATIENT)
Dept: MEDICAL GROUP | Facility: MEDICAL CENTER | Age: 35
End: 2020-07-20
Payer: COMMERCIAL

## 2020-07-20 VITALS
TEMPERATURE: 98.4 F | RESPIRATION RATE: 16 BRPM | HEIGHT: 69 IN | DIASTOLIC BLOOD PRESSURE: 78 MMHG | WEIGHT: 225 LBS | SYSTOLIC BLOOD PRESSURE: 126 MMHG | OXYGEN SATURATION: 96 % | HEART RATE: 74 BPM | BODY MASS INDEX: 33.33 KG/M2

## 2020-07-20 DIAGNOSIS — E66.9 OBESITY (BMI 30-39.9): ICD-10-CM

## 2020-07-20 DIAGNOSIS — F41.1 GENERALIZED ANXIETY DISORDER: ICD-10-CM

## 2020-07-20 DIAGNOSIS — F32.0 CURRENT MILD EPISODE OF MAJOR DEPRESSIVE DISORDER WITHOUT PRIOR EPISODE (HCC): ICD-10-CM

## 2020-07-20 PROCEDURE — 99214 OFFICE O/P EST MOD 30 MIN: CPT | Performed by: PHYSICIAN ASSISTANT

## 2020-07-20 RX ORDER — VENLAFAXINE HYDROCHLORIDE 150 MG/1
150 CAPSULE, EXTENDED RELEASE ORAL DAILY
Qty: 90 CAP | Refills: 1 | Status: SHIPPED | OUTPATIENT
Start: 2020-07-20 | End: 2020-07-20 | Stop reason: SDUPTHER

## 2020-07-20 RX ORDER — VENLAFAXINE HYDROCHLORIDE 37.5 MG/1
37.5 CAPSULE, EXTENDED RELEASE ORAL DAILY
Qty: 90 CAP | Refills: 1 | Status: SHIPPED | OUTPATIENT
Start: 2020-07-20 | End: 2020-11-24 | Stop reason: SDUPTHER

## 2020-07-20 RX ORDER — VENLAFAXINE HYDROCHLORIDE 37.5 MG/1
37.5 CAPSULE, EXTENDED RELEASE ORAL DAILY
Qty: 90 CAP | Refills: 1 | Status: SHIPPED | OUTPATIENT
Start: 2020-07-20 | End: 2020-07-20 | Stop reason: SDUPTHER

## 2020-07-20 RX ORDER — VENLAFAXINE HYDROCHLORIDE 150 MG/1
150 CAPSULE, EXTENDED RELEASE ORAL DAILY
Qty: 90 CAP | Refills: 1 | Status: SHIPPED | OUTPATIENT
Start: 2020-07-20 | End: 2020-11-24 | Stop reason: SDUPTHER

## 2020-07-20 NOTE — PROGRESS NOTES
"Subjective:   Jrodon Mclean is a 35 y.o. male here today for anxiety and depression.    MERCEDEZ (generalized anxiety disorder)  This is a 35-year-old male here today to follow-up on his anxiety and depression.  Symptoms have been stable.  Would like to possibly increase the dose as he is currently on 150 mg of extended release Effexor daily.  Denies any exacerbation of his symptoms recently but feels that with counseling and increase of his Effexor this may help him improve his condition.       Current medicines (including changes today)  Current Outpatient Medications   Medication Sig Dispense Refill   • venlafaxine XR (EFFEXOR XR) 37.5 MG CAPSULE SR 24 HR Take 1 Cap by mouth every day. 90 Cap 1   • venlafaxine (EFFEXOR-XR) 150 MG extended-release capsule Take 1 Cap by mouth every day. 90 Cap 1   • traZODone (DESYREL) 50 MG Tab Take 1 Tab by mouth every bedtime. 90 Tab 3     No current facility-administered medications for this visit.      He  has no past medical history on file.    Social History and Family History were reviewed and updated.    ROS   No chest pain, no shortness of breath, no abdominal pain and all other systems were reviewed and are negative.       Objective:     /78   Pulse 74   Temp 36.9 °C (98.4 °F) (Temporal)   Resp 16   Ht 1.753 m (5' 9\")   Wt 102.1 kg (225 lb)   SpO2 96%  Body mass index is 33.23 kg/m².   Physical Exam:  Constitutional: Alert, no distress.  Skin: Warm, dry, good turgor, no rashes in visible areas.  Eye: Equal, round and reactive, conjunctiva clear, lids normal.  ENMT: Lips without lesions, good dentition, oropharynx clear.  Neck: Trachea midline, no masses.   Lymph: No cervical or supraclavicular lymphadenopathy  Respiratory: Unlabored respiratory effort, lungs appear clear, no wheezes.  Cardiovascular: Regular rate and rhythm.  Psych: Alert and oriented x3, normal affect and mood.        Assessment and Plan:   The following treatment plan was discussed    1. " MERCEDEZ (generalized anxiety disorder)  Chronic condition.  Stable.  Will increase Effexor with a 37.5 mg extended release capsule.  We will add that to the 150 mg.  - venlafaxine XR (EFFEXOR XR) 37.5 MG CAPSULE SR 24 HR; Take 1 Cap by mouth every day.  Dispense: 90 Cap; Refill: 1  - venlafaxine (EFFEXOR-XR) 150 MG extended-release capsule; Take 1 Cap by mouth every day.  Dispense: 90 Cap; Refill: 1    2. Current mild episode of major depressive disorder without prior episode (HCC)  Chronic condition.  Stable.  Continue with therapy.  Renewed medications as directed.  I will see him in 3 months.  - venlafaxine XR (EFFEXOR XR) 37.5 MG CAPSULE SR 24 HR; Take 1 Cap by mouth every day.  Dispense: 90 Cap; Refill: 1  - venlafaxine (EFFEXOR-XR) 150 MG extended-release capsule; Take 1 Cap by mouth every day.  Dispense: 90 Cap; Refill: 1    3. Obesity (BMI 30-39.9)  Chronic condition.  Stable.  Continue exercising routinely.  - Patient identified as having weight management issue.  Appropriate orders and counseling given.      Followup: Return in about 3 months (around 10/20/2020), or if symptoms worsen or fail to improve.    Please note that this dictation was created using voice recognition software. I have made every reasonable attempt to correct obvious errors, but I expect that there are errors of grammar and possibly content that I did not discover before finalizing the note.

## 2020-07-20 NOTE — ASSESSMENT & PLAN NOTE
This is a 35-year-old male here today to follow-up on his anxiety and depression.  Symptoms have been stable.  Would like to possibly increase the dose as he is currently on 150 mg of extended release Effexor daily.  Denies any exacerbation of his symptoms recently but feels that with counseling and increase of his Effexor this may help him improve his condition.

## 2020-11-03 ENCOUNTER — NON-PROVIDER VISIT (OUTPATIENT)
Dept: URGENT CARE | Facility: CLINIC | Age: 35
End: 2020-11-03

## 2020-11-03 DIAGNOSIS — Z02.1 PRE-EMPLOYMENT DRUG SCREENING: ICD-10-CM

## 2020-11-03 PROCEDURE — 80305 DRUG TEST PRSMV DIR OPT OBS: CPT | Performed by: PHYSICIAN ASSISTANT

## 2020-11-24 ENCOUNTER — APPOINTMENT (OUTPATIENT)
Dept: MEDICAL GROUP | Facility: MEDICAL CENTER | Age: 35
End: 2020-11-24
Payer: COMMERCIAL

## 2020-11-24 DIAGNOSIS — F41.1 GENERALIZED ANXIETY DISORDER: ICD-10-CM

## 2020-11-24 DIAGNOSIS — F32.0 CURRENT MILD EPISODE OF MAJOR DEPRESSIVE DISORDER WITHOUT PRIOR EPISODE (HCC): ICD-10-CM

## 2020-11-24 RX ORDER — VENLAFAXINE HYDROCHLORIDE 37.5 MG/1
37.5 CAPSULE, EXTENDED RELEASE ORAL DAILY
Qty: 90 CAP | Refills: 0 | Status: CANCELLED | OUTPATIENT
Start: 2020-11-24

## 2020-11-24 RX ORDER — VENLAFAXINE HYDROCHLORIDE 150 MG/1
150 CAPSULE, EXTENDED RELEASE ORAL DAILY
Qty: 90 CAP | Refills: 0 | Status: SHIPPED | OUTPATIENT
Start: 2020-11-24 | End: 2022-07-18

## 2020-11-24 RX ORDER — VENLAFAXINE HYDROCHLORIDE 150 MG/1
150 CAPSULE, EXTENDED RELEASE ORAL DAILY
Qty: 90 CAP | Refills: 0 | Status: CANCELLED | OUTPATIENT
Start: 2020-11-24

## 2020-11-24 RX ORDER — VENLAFAXINE HYDROCHLORIDE 37.5 MG/1
37.5 CAPSULE, EXTENDED RELEASE ORAL DAILY
Qty: 90 CAP | Refills: 0 | Status: SHIPPED | OUTPATIENT
Start: 2020-11-24 | End: 2022-07-18

## 2022-02-03 ENCOUNTER — NON-PROVIDER VISIT (OUTPATIENT)
Dept: URGENT CARE | Facility: CLINIC | Age: 37
End: 2022-02-03

## 2022-02-03 DIAGNOSIS — Z02.1 PRE-EMPLOYMENT DRUG SCREENING: ICD-10-CM

## 2022-02-03 PROCEDURE — 80305 DRUG TEST PRSMV DIR OPT OBS: CPT | Performed by: PHYSICIAN ASSISTANT

## 2022-07-18 ENCOUNTER — OFFICE VISIT (OUTPATIENT)
Dept: MEDICAL GROUP | Facility: MEDICAL CENTER | Age: 37
End: 2022-07-18
Payer: COMMERCIAL

## 2022-07-18 VITALS
HEART RATE: 81 BPM | BODY MASS INDEX: 30.07 KG/M2 | OXYGEN SATURATION: 95 % | WEIGHT: 203 LBS | HEIGHT: 69 IN | DIASTOLIC BLOOD PRESSURE: 60 MMHG | TEMPERATURE: 97.8 F | SYSTOLIC BLOOD PRESSURE: 110 MMHG

## 2022-07-18 DIAGNOSIS — F32.0 CURRENT MILD EPISODE OF MAJOR DEPRESSIVE DISORDER WITHOUT PRIOR EPISODE (HCC): ICD-10-CM

## 2022-07-18 DIAGNOSIS — M79.671 PAIN OF RIGHT HEEL: ICD-10-CM

## 2022-07-18 DIAGNOSIS — Z00.00 PREVENTATIVE HEALTH CARE: ICD-10-CM

## 2022-07-18 PROBLEM — F51.01 PRIMARY INSOMNIA: Status: RESOLVED | Noted: 2018-09-06 | Resolved: 2022-07-18

## 2022-07-18 PROBLEM — F11.10 OPIOID ABUSE (HCC): Status: RESOLVED | Noted: 2018-05-01 | Resolved: 2022-07-18

## 2022-07-18 PROCEDURE — 99214 OFFICE O/P EST MOD 30 MIN: CPT | Performed by: PHYSICIAN ASSISTANT

## 2022-07-18 RX ORDER — OXCARBAZEPINE 150 MG/1
1 TABLET, FILM COATED ORAL DAILY
COMMUNITY
Start: 2022-06-19

## 2022-07-18 RX ORDER — BUPROPION HYDROCHLORIDE 150 MG/1
2 TABLET, EXTENDED RELEASE ORAL DAILY
COMMUNITY
Start: 2022-06-19

## 2022-07-18 ASSESSMENT — PATIENT HEALTH QUESTIONNAIRE - PHQ9
SUM OF ALL RESPONSES TO PHQ9 QUESTIONS 1 AND 2: 0
7. TROUBLE CONCENTRATING ON THINGS, SUCH AS READING THE NEWSPAPER OR WATCHING TELEVISION: SEVERAL DAYS
8. MOVING OR SPEAKING SO SLOWLY THAT OTHER PEOPLE COULD HAVE NOTICED. OR THE OPPOSITE, BEING SO FIGETY OR RESTLESS THAT YOU HAVE BEEN MOVING AROUND A LOT MORE THAN USUAL: NOT AT ALL
SUM OF ALL RESPONSES TO PHQ QUESTIONS 1-9: 7
2. FEELING DOWN, DEPRESSED, IRRITABLE, OR HOPELESS: NOT AT ALL
9. THOUGHTS THAT YOU WOULD BE BETTER OFF DEAD, OR OF HURTING YOURSELF: NOT AT ALL
3. TROUBLE FALLING OR STAYING ASLEEP OR SLEEPING TOO MUCH: MORE THAN HALF THE DAYS
5. POOR APPETITE OR OVEREATING: MORE THAN HALF THE DAYS
1. LITTLE INTEREST OR PLEASURE IN DOING THINGS: NOT AT ALL
6. FEELING BAD ABOUT YOURSELF - OR THAT YOU ARE A FAILURE OR HAVE LET YOURSELF OR YOUR FAMILY DOWN: NOT AL ALL
4. FEELING TIRED OR HAVING LITTLE ENERGY: MORE THAN HALF THE DAYS

## 2022-07-18 NOTE — ASSESSMENT & PLAN NOTE
Chronic condition.  Currently follows with Dr. Paulson or at his office with a nurse practitioner.  Currently on Trileptal and bupropion 150 mg 2 tablets a day.  Scored a 7 on his PHQ-9.

## 2022-07-18 NOTE — ASSESSMENT & PLAN NOTE
This is a pleasant 37-year-old male here today to complain about a chronic history of right heel pain but over the past week it is gotten much worse.  Has been having difficulty walking.  Has been icing the heel.  Taking ibuprofen.  Stretching.  Wears good shoes with support.  No improvement.  Requesting an injection into the heel.  States there is swelling on the sides of the heel.  Pain is worse in the morning when he gets out of bed.

## 2022-07-18 NOTE — PROGRESS NOTES
"Subjective:   Jordon Mclean is a 37 y.o. male here today for right heel pain and depression.    Pain of right heel  This is a pleasant 37-year-old male here today to complain about a chronic history of right heel pain but over the past week it is gotten much worse.  Has been having difficulty walking.  Has been icing the heel.  Taking ibuprofen.  Stretching.  Wears good shoes with support.  No improvement.  Requesting an injection into the heel.  States there is swelling on the sides of the heel.  Pain is worse in the morning when he gets out of bed.    Major depressive disorder with current active episode  Chronic condition.  Currently follows with Dr. Paulson or at his office with a nurse practitioner.  Currently on Trileptal and bupropion 150 mg 2 tablets a day.  Scored a 7 on his PHQ-9.       Current medicines (including changes today)  Current Outpatient Medications   Medication Sig Dispense Refill   • OXcarbazepine (TRILEPTAL) 150 MG Tab Take 1 Tablet by mouth every day.     • buPROPion SR (WELLBUTRIN-SR) 150 MG TABLET SR 12 HR sustained-release tablet Take 2 Tablets by mouth every day.       No current facility-administered medications for this visit.     He  has no past medical history on file.    Social History and Family History were reviewed and updated.    ROS   No chest pain, no shortness of breath, no abdominal pain and all other systems were reviewed and are negative.       Objective:     /60 (BP Location: Right arm, Patient Position: Sitting, BP Cuff Size: Adult)   Pulse 81   Temp 36.6 °C (97.8 °F) (Temporal)   Ht 1.753 m (5' 9\")   Wt 92.1 kg (203 lb)   SpO2 95%  Body mass index is 29.98 kg/m².   Physical Exam:  Constitutional: Alert, no distress.  Skin: Warm, dry, good turgor, no rashes in visible areas.  Eye: Equal, round and reactive, conjunctiva clear, lids normal.  ENMT: Lips without lesions, good dentition, oropharynx clear.  Neck: Trachea midline, no masses.   Lymph: No " cervical or supraclavicular lymphadenopathy  Respiratory: Unlabored respiratory effort, lungs appear clear, no wheezes.  Musculoskeletal: Heel point tenderness on the right side.  Psych: Alert and oriented x3, normal affect and mood.        Assessment and Plan:   The following treatment plan was discussed    1. Pain of right heel  Chronic condition with recent exacerbation suggestive of plantar fasciitis.  Referred to podiatry for management.  Advised to contact insurance regarding requiring a referral.  If not may contact the podiatrist directly.  Referral may still be in order to get him in sooner.  He may contact me through [employer].  - Referral to Podiatry    2. Current mild episode of major depressive disorder without prior episode (HCC)  Chronic condition.  Stable.  Updated medications.  Continue to follow with psychiatry.    3. Preventative health care  Ordered labs.  Fast 8 hours.  He will be contacted with the results.  - CBC WITH DIFFERENTIAL; Future  - Comp Metabolic Panel; Future  - TSH WITH REFLEX TO FT4; Future  - Lipid Profile; Future  - HEMOGLOBIN A1C; Future         Followup: Return if symptoms worsen or fail to improve.    Please note that this dictation was created using voice recognition software. I have made every reasonable attempt to correct obvious errors, but I expect that there are errors of grammar and possibly content that I did not discover before finalizing the note.

## 2022-08-01 ENCOUNTER — NON-PROVIDER VISIT (OUTPATIENT)
Dept: URGENT CARE | Facility: CLINIC | Age: 37
End: 2022-08-01

## 2022-08-01 DIAGNOSIS — Z02.1 PRE-EMPLOYMENT DRUG SCREENING: ICD-10-CM

## 2022-08-01 PROCEDURE — 80305 DRUG TEST PRSMV DIR OPT OBS: CPT | Performed by: PHYSICIAN ASSISTANT

## 2022-10-05 ENCOUNTER — OFFICE VISIT (OUTPATIENT)
Dept: URGENT CARE | Facility: CLINIC | Age: 37
End: 2022-10-05
Payer: COMMERCIAL

## 2022-10-05 VITALS
BODY MASS INDEX: 30.81 KG/M2 | OXYGEN SATURATION: 96 % | WEIGHT: 208 LBS | SYSTOLIC BLOOD PRESSURE: 126 MMHG | HEIGHT: 69 IN | HEART RATE: 81 BPM | TEMPERATURE: 98.3 F | RESPIRATION RATE: 20 BRPM | DIASTOLIC BLOOD PRESSURE: 80 MMHG

## 2022-10-05 DIAGNOSIS — J02.0 ACUTE STREPTOCOCCAL PHARYNGITIS: ICD-10-CM

## 2022-10-05 DIAGNOSIS — J02.9 SORE THROAT: ICD-10-CM

## 2022-10-05 LAB
INT CON NEG: NEGATIVE
INT CON POS: POSITIVE
S PYO AG THROAT QL: POSITIVE

## 2022-10-05 PROCEDURE — 87880 STREP A ASSAY W/OPTIC: CPT | Performed by: FAMILY MEDICINE

## 2022-10-05 PROCEDURE — 99213 OFFICE O/P EST LOW 20 MIN: CPT | Performed by: FAMILY MEDICINE

## 2022-10-05 RX ORDER — CEPHALEXIN 500 MG/1
500 CAPSULE ORAL 2 TIMES DAILY
Qty: 20 CAPSULE | Refills: 0 | Status: SHIPPED | OUTPATIENT
Start: 2022-10-05 | End: 2022-10-15

## 2022-10-05 NOTE — PROGRESS NOTES
"  Subjective:      37 y.o. male presents to urgent care for cold symptoms that started on Monday.  He is experiencing sore throat, left ear pressure, fever, and body aches.  No headaches, diarrhea, or nasal congestion.  He has been using TheraFlu and Tylenol with moderate relief in symptoms.  He denies any regular tobacco product use.  He does have a history of childhood asthma, but has not used medications for several years.  He is fully vaccinated against COVID.  No known sick contacts.    He denies any other questions or concerns at this time.    Current problem list, medication, and past medical/surgical history were reviewed in Epic.    ROS  See HPI     Objective:      /80 (BP Location: Left arm, Patient Position: Sitting)   Pulse 81   Temp 36.8 °C (98.3 °F) (Temporal)   Resp 20   Ht 1.753 m (5' 9\")   Wt 94.3 kg (208 lb)   SpO2 96%   BMI 30.72 kg/m²     Physical Exam  Constitutional:       General: He is not in acute distress.     Appearance: He is not diaphoretic.   HENT:      Right Ear: Tympanic membrane, ear canal and external ear normal.      Left Ear: Tympanic membrane, ear canal and external ear normal.      Mouth/Throat:      Tongue: Tongue does not deviate from midline.      Palate: No lesions.      Pharynx: Uvula midline. Posterior oropharyngeal erythema present.      Tonsils: Tonsillar exudate present. 2+ on the right. 2+ on the left.   Cardiovascular:      Rate and Rhythm: Normal rate and regular rhythm.      Heart sounds: Normal heart sounds.   Pulmonary:      Effort: Pulmonary effort is normal. No respiratory distress.      Breath sounds: Normal breath sounds.   Neurological:      Mental Status: He is alert.   Psychiatric:         Mood and Affect: Affect normal.         Judgment: Judgment normal.     Assessment/Plan:     1. Acute streptococcal pharyngitis  2. Sore throat  Rapid strep positive.  Penicillin allergy listed in chart, patient has no recollection of this.  We will trial " Keflex.  Tylenol and ibuprofen as needed for symptomatic relief.  - POCT Rapid Strep A  - cephALEXin (KEFLEX) 500 MG Cap; Take 1 Capsule by mouth 2 times a day for 10 days.  Dispense: 20 Capsule; Refill: 0      Instructed to return to Urgent Care or nearest Emergency Department if symptoms fail to improve, for any change in condition, further concerns, or new concerning symptoms. Patient states understanding of the plan of care and discharge instructions.    Geni Sotelo M.D.

## 2022-10-05 NOTE — LETTER
October 5, 2022    To Whom It May Concern:         This is confirmation that Jordon Curriening attended his scheduled appointment with Geni Sotelo M.D. on 10/05/22. He may return to work 10/7/2022 without any restrictions.         If you have any questions please do not hesitate to call me at the phone number listed below.    Sincerely,          Geni Sotelo M.D.  [phone]

## 2023-01-25 ENCOUNTER — OFFICE VISIT (OUTPATIENT)
Dept: URGENT CARE | Facility: CLINIC | Age: 38
End: 2023-01-25
Payer: COMMERCIAL

## 2023-01-25 VITALS
RESPIRATION RATE: 14 BRPM | TEMPERATURE: 99 F | WEIGHT: 210 LBS | HEART RATE: 98 BPM | SYSTOLIC BLOOD PRESSURE: 110 MMHG | HEIGHT: 69 IN | DIASTOLIC BLOOD PRESSURE: 56 MMHG | BODY MASS INDEX: 31.1 KG/M2 | OXYGEN SATURATION: 95 %

## 2023-01-25 DIAGNOSIS — B30.9 ACUTE VIRAL CONJUNCTIVITIS OF LEFT EYE: ICD-10-CM

## 2023-01-25 PROCEDURE — 99213 OFFICE O/P EST LOW 20 MIN: CPT | Performed by: STUDENT IN AN ORGANIZED HEALTH CARE EDUCATION/TRAINING PROGRAM

## 2023-01-25 RX ORDER — DEXTROAMPHETAMINE/AMPHETAMINE 15 MG
CAPSULE, EXT RELEASE 24 HR ORAL
COMMUNITY
Start: 2022-11-17

## 2023-01-25 RX ORDER — OLOPATADINE HYDROCHLORIDE 1 MG/ML
1 SOLUTION/ DROPS OPHTHALMIC 2 TIMES DAILY
Qty: 5 ML | Refills: 0 | Status: SHIPPED | OUTPATIENT
Start: 2023-01-25

## 2023-01-25 RX ORDER — BUPROPION HYDROCHLORIDE 300 MG/1
TABLET ORAL
COMMUNITY
Start: 2022-11-17

## 2023-01-25 NOTE — PROGRESS NOTES
Subjective:   CHIEF COMPLAINT  Chief Complaint   Patient presents with    Conjunctivitis     X2 days       HPI  Jordon Mclean is a 38 y.o. male who presents with a chief complaint of left eye redness and pruritus since yesterday.  He has tried OTC Visine which has not helped.  Denies associated symptoms of eye pain, discharge or blurry vision.  No fevers or chills.  Denies any additional complaints or concerns.    REVIEW OF SYSTEMS  General: no fever or chills  GI: no nausea or vomiting  See HPI for further details.    PAST MEDICAL HISTORY  Patient Active Problem List    Diagnosis Date Noted    Pain of right heel 07/18/2022    Anal fissure 04/17/2020    MERCEDEZ (generalized anxiety disorder) 12/20/2019    Obesity (BMI 30-39.9) 04/23/2019    History of opioid abuse ([facility]) 09/06/2018    Mood disorder ([facility]) 09/06/2018    Chronic bilateral low back pain with right-sided sciatica 09/06/2018    Major depressive disorder with current active episode 05/01/2018    Atypical nevus 05/01/2018       SURGICAL HISTORY  patient denies any surgical history    ALLERGIES  Allergies   Allergen Reactions    Neosporin Plus Pain Itching       CURRENT MEDICATIONS  Home Medications       Reviewed by Makr Lyles D.O. (Physician) on 01/25/23 at 1324  Med List Status: <None>     Medication Last Dose Status   ADDERALL XR, 15MG, 15 MG XR capsule Taking Active   buPROPion (WELLBUTRIN XL) 300 MG XL tablet Taking Active   buPROPion SR (WELLBUTRIN-SR) 150 MG TABLET SR 12 HR sustained-release tablet Taking Active   OXcarbazepine (TRILEPTAL) 150 MG Tab Taking Active                    SOCIAL HISTORY  Social History     Tobacco Use    Smoking status: Never    Smokeless tobacco: Current     Types: Chew   Vaping Use    Vaping Use: Some days    Substances: Nicotine    Devices: Disposable   Substance and Sexual Activity    Alcohol use: No    Drug use: No     Types: Marijuana     Comment: Prescription medications Norco, Percocet. 1 gm      Sexual  "activity: Yes     Partners: Female     Comment: , two children       FAMILY HISTORY  Family History   Problem Relation Age of Onset    Diabetes Mother     Heart Disease Father           Objective:   PHYSICAL EXAM  VITAL SIGNS: /56 (BP Location: Left arm, Patient Position: Sitting, BP Cuff Size: Adult)   Pulse 98   Temp 37.2 °C (99 °F) (Temporal)   Resp 14   Ht 1.753 m (5' 9\")   Wt 95.3 kg (210 lb)   SpO2 95%   BMI 31.01 kg/m²     Gen: no acute distress, normal voice  Skin: dry, intact, moist mucosal membranes  Eye: EOMI and PERRLA b/l.  Moderate left conjunctival injection.  No discharge.  No photophobia.  Lungs: CTAB w/ symmetric expansion  CV: RRR w/o murmurs or clicks  Psych: normal affect, normal judgement, alert, awake    Assessment/Plan:     1. Acute viral conjunctivitis of left eye  olopatadine (PATANOL) 0.1 % ophthalmic solution      Examination consistent with viral conjunctivitis.  - Ordered Rx for Patanol  -Recommended Zyrtec bid as needed for pruritus  - Provided note for work    Differential diagnosis, natural history, supportive care, and indications for immediate follow-up discussed. All questions answered. Patient agrees with the plan of care.    Follow-up as needed if symptoms worsen or fail to improve to PCP, Urgent care or Emergency Room.    Please note that this dictation was created using voice recognition software. I have made a reasonable attempt to correct obvious errors, but I expect that there are errors of grammar and possibly content that I did not discover before finalizing the note.         "

## 2023-07-17 ENCOUNTER — APPOINTMENT (OUTPATIENT)
Dept: MEDICAL GROUP | Facility: MEDICAL CENTER | Age: 38
End: 2023-07-17
Payer: COMMERCIAL

## 2024-12-07 ENCOUNTER — APPOINTMENT (OUTPATIENT)
Dept: URGENT CARE | Facility: CLINIC | Age: 39
End: 2024-12-07
Payer: COMMERCIAL

## 2025-04-02 ENCOUNTER — OFFICE VISIT (OUTPATIENT)
Dept: MEDICAL GROUP | Facility: MEDICAL CENTER | Age: 40
End: 2025-04-02
Payer: COMMERCIAL

## 2025-04-02 VITALS
HEIGHT: 68 IN | SYSTOLIC BLOOD PRESSURE: 114 MMHG | OXYGEN SATURATION: 97 % | BODY MASS INDEX: 30.45 KG/M2 | HEART RATE: 97 BPM | WEIGHT: 200.9 LBS | DIASTOLIC BLOOD PRESSURE: 64 MMHG | TEMPERATURE: 97.3 F

## 2025-04-02 DIAGNOSIS — F32.0 CURRENT MILD EPISODE OF MAJOR DEPRESSIVE DISORDER WITHOUT PRIOR EPISODE (HCC): ICD-10-CM

## 2025-04-02 DIAGNOSIS — R13.12 OROPHARYNGEAL DYSPHAGIA: ICD-10-CM

## 2025-04-02 DIAGNOSIS — K21.9 GASTROESOPHAGEAL REFLUX DISEASE, UNSPECIFIED WHETHER ESOPHAGITIS PRESENT: ICD-10-CM

## 2025-04-02 PROBLEM — K60.2 ANAL FISSURE: Status: RESOLVED | Noted: 2020-04-17 | Resolved: 2025-04-02

## 2025-04-02 PROCEDURE — 99214 OFFICE O/P EST MOD 30 MIN: CPT | Performed by: PHYSICIAN ASSISTANT

## 2025-04-02 PROCEDURE — 3078F DIAST BP <80 MM HG: CPT | Performed by: PHYSICIAN ASSISTANT

## 2025-04-02 PROCEDURE — 3074F SYST BP LT 130 MM HG: CPT | Performed by: PHYSICIAN ASSISTANT

## 2025-04-02 RX ORDER — OMEPRAZOLE 20 MG/1
20 CAPSULE, DELAYED RELEASE ORAL DAILY
Qty: 90 CAPSULE | Refills: 1 | Status: SHIPPED | OUTPATIENT
Start: 2025-04-02 | End: 2025-09-29

## 2025-04-02 ASSESSMENT — PATIENT HEALTH QUESTIONNAIRE - PHQ9
2. FEELING DOWN, DEPRESSED, IRRITABLE, OR HOPELESS: NOT AT ALL
5. POOR APPETITE OR OVEREATING: SEVERAL DAYS
1. LITTLE INTEREST OR PLEASURE IN DOING THINGS: SEVERAL DAYS
3. TROUBLE FALLING OR STAYING ASLEEP OR SLEEPING TOO MUCH: NEARLY EVERY DAY
8. MOVING OR SPEAKING SO SLOWLY THAT OTHER PEOPLE COULD HAVE NOTICED. OR THE OPPOSITE, BEING SO FIGETY OR RESTLESS THAT YOU HAVE BEEN MOVING AROUND A LOT MORE THAN USUAL: NOT AT ALL
SUM OF ALL RESPONSES TO PHQ9 QUESTIONS 1 AND 2: 1
6. FEELING BAD ABOUT YOURSELF - OR THAT YOU ARE A FAILURE OR HAVE LET YOURSELF OR YOUR FAMILY DOWN: NOT AL ALL
4. FEELING TIRED OR HAVING LITTLE ENERGY: SEVERAL DAYS
9. THOUGHTS THAT YOU WOULD BE BETTER OFF DEAD, OR OF HURTING YOURSELF: NOT AT ALL
7. TROUBLE CONCENTRATING ON THINGS, SUCH AS READING THE NEWSPAPER OR WATCHING TELEVISION: NOT AT ALL
SUM OF ALL RESPONSES TO PHQ QUESTIONS 1-9: 6

## 2025-04-02 NOTE — PROGRESS NOTES
Subjective:     History of Present Illness  The patient presents for evaluation of dysphagia, depression, and gastroesophageal reflux disease.    He has been experiencing dysphagia for the past 30 days, which has progressively worsened. He reports a sensation of clicking in his throat when applying pressure and difficulty swallowing even without food intake. He expresses concern about potential cancer due to his advancing age. He has been off GLP-1 for the past 30 days due to medication issues and has lost 37 pounds. He continues to vape, which he notes exacerbates his acid reflux.     He also reports occasional heartburn or reflux, occurring at least twice weekly. He has previously used over-the-counter Prilosec, with the last dose taken on Friday.    His depression is manageable, with occasional episodes. He is under the care of a psychiatrist who monitors his condition. He reports little interest or pleasure in activities for several days, no feelings of depression or hopelessness, daily sleep disturbances, feeling tired or having little energy for several days, poor appetite or overeating for several days, and no feelings of self-worthlessness or suicidal ideation. He works night shifts, which he believes contributes to his sleep issues. He is currently on Wellbutrin 300 mg and Adderall 15 mg time release daily. He has discontinued the use of Trintellix.    Supplemental Information  He had norovirus infection 2 months ago, which caused profuse vomiting and required an emergency room visit. A CT scan was performed, and he was treated for dehydration with fluids. He had labs done in the last 6 months because of the GLP medication. They checked his cholesterol and thyroid. He had chickenpox as a kid. He had an anal fissure, which has resolved, but he suspects the onset of hemorrhoids.      Current medicines (including changes today)  Current Outpatient Medications   Medication Sig Dispense Refill    omeprazole  "(PRILOSEC) 20 MG delayed-release capsule Take 1 Capsule by mouth every day for 180 days. 1/2 hour prior to same meal. 90 Capsule 1    ADDERALL XR, 15MG, 15 MG XR capsule       buPROPion (WELLBUTRIN XL) 300 MG XL tablet        No current facility-administered medications for this visit.     He  has no past medical history on file.    ROS   No chest pain, no shortness of breath, no abdominal pain  Positive ROS as per HPI.  All other systems reviewed and are negative.     Objective:     /64 (BP Location: Left arm, Patient Position: Sitting, BP Cuff Size: Adult)   Pulse 97   Temp 36.3 °C (97.3 °F) (Temporal)   Ht 1.725 m (5' 7.91\")   Wt 91.1 kg (200 lb 14.4 oz)   SpO2 97%  Body mass index is 30.63 kg/m².   Physical Exam    Constitutional: Alert, no distress.  Skin: Warm, dry, good turgor, no rashes in visible areas.  Eye: Equal, round and reactive, conjunctiva clear, lids normal.  ENMT: Lips without lesions, good dentition, oropharynx clear.  Neck: Trachea midline, no masses, no thyromegaly.   Psych: Alert and oriented x3, normal affect and mood.      Results          Assessment and Plan:   The following treatment plan was discussed    Assessment & Plan  1.  Oropharyngeal dysphagia.  Acute condition. The patient reports difficulty swallowing, with a sensation of a clicking sound when pressing on the throat. This has been more pronounced over the past 30 days. An urgent referral to an ENT specialist will be made for further evaluation. Additionally, an ultrasound of the neck and thyroid will be ordered to rule out any underlying issues.    2. Gastroesophageal Reflux Disease.  Chronic, intermittent condition. The patient experiences heartburn or reflux a couple of times a week, which has been exacerbated by vaping. He is advised to take omeprazole 20 mg daily, half an hour before lunch or dinner, for 4 to 6 weeks. The patient is also advised to avoid vaping to reduce reflux symptoms.  Take medication for a few " weeks to see if his symptoms may resolve and then discontinue and see what triggers his symptoms.    3. Depression.  Chronic condition.  Stable.  The patient is currently on Wellbutrin 300 mg daily and reports that his depression is manageable. He is also taking Adderall 15 mg time-release daily. A depression screening questionnaire was conducted, revealing some issues with sleep and appetite, which may be influenced by his night work schedule.  Scored low on PHQ-9.  The patient is advised to continue his current medication regimen and follow up with his psychiatrist as needed.    Follow-up  The patient will follow up in 1 year.      ORDERS:  1. Oropharyngeal dysphagia    - US-THYROID; Future  - Referral to ENT    2. Gastroesophageal reflux disease, unspecified whether esophagitis present    - omeprazole (PRILOSEC) 20 MG delayed-release capsule; Take 1 Capsule by mouth every day for 180 days. 1/2 hour prior to same meal.  Dispense: 90 Capsule; Refill: 1    3. Current mild episode of major depressive disorder without prior episode (HCC)          Please note that this dictation was created using voice recognition software. I have made every reasonable attempt to correct obvious errors, but I expect that there are errors of grammar and possibly content that I did not discover before finalizing the note.      Attestation      Verbal consent was acquired by the patient to use [employer] ambient listening note generation during this visit Yes

## 2025-04-03 NOTE — Clinical Note
REFERRAL APPROVAL NOTICE         Sent on April 3, 2025                   Jordon Mclean  [address] [facility] [address]                   Dear Mr. Mclean,    After a careful review of the medical information and benefit coverage, [facility] has processed your referral. See below for additional details.    If applicable, you must be actively enrolled with your insurance for coverage of the authorized service. If you have any questions regarding your coverage, please contact your insurance directly.    REFERRAL INFORMATION   Referral #:  [id number]  Referred-To Provider    Referred-By Provider:  Otolaryngology    Kirk Cohen P.A.-C.   [facility]      [address]  [address]  [facility] [address] [phone] [address] [facility] [address]  [phone]    Referral Start Date:  04/02/2025  Referral End Date:   04/02/2026             SCHEDULING  If you do not already have an appointment, please call [phone] to make an appointment.     MORE INFORMATION  If you do not already have a [employer] account, sign up at: [email or web address]  You can access your medical information, make appointments, see lab results, billing information, and more.  If you have questions regarding this referral, please contact  the [facility] Referrals department at:             [phone]. Monday - Friday 8:00AM - 5:00PM.     Sincerely,    [facility]

## 2025-07-07 ENCOUNTER — APPOINTMENT (OUTPATIENT)
Dept: RADIOLOGY | Facility: MEDICAL CENTER | Age: 40
End: 2025-07-07
Attending: PHYSICIAN ASSISTANT
Payer: COMMERCIAL